# Patient Record
Sex: FEMALE | Race: WHITE | NOT HISPANIC OR LATINO
[De-identification: names, ages, dates, MRNs, and addresses within clinical notes are randomized per-mention and may not be internally consistent; named-entity substitution may affect disease eponyms.]

---

## 2018-05-15 ENCOUNTER — APPOINTMENT (OUTPATIENT)
Dept: OPHTHALMOLOGY | Facility: CLINIC | Age: 23
End: 2018-05-15
Payer: MEDICAID

## 2018-05-15 PROCEDURE — 92004 COMPRE OPH EXAM NEW PT 1/>: CPT

## 2018-05-15 PROCEDURE — 92015 DETERMINE REFRACTIVE STATE: CPT

## 2019-05-14 ENCOUNTER — APPOINTMENT (OUTPATIENT)
Dept: OPHTHALMOLOGY | Facility: CLINIC | Age: 24
End: 2019-05-14

## 2019-10-30 ENCOUNTER — APPOINTMENT (OUTPATIENT)
Dept: OPHTHALMOLOGY | Facility: CLINIC | Age: 24
End: 2019-10-30

## 2019-11-12 ENCOUNTER — APPOINTMENT (OUTPATIENT)
Dept: OPHTHALMOLOGY | Facility: CLINIC | Age: 24
End: 2019-11-12
Payer: MEDICAID

## 2019-11-12 ENCOUNTER — NON-APPOINTMENT (OUTPATIENT)
Age: 24
End: 2019-11-12

## 2019-11-12 PROCEDURE — 92014 COMPRE OPH EXAM EST PT 1/>: CPT

## 2020-11-17 ENCOUNTER — NON-APPOINTMENT (OUTPATIENT)
Age: 25
End: 2020-11-17

## 2020-11-17 ENCOUNTER — APPOINTMENT (OUTPATIENT)
Dept: OPHTHALMOLOGY | Facility: CLINIC | Age: 25
End: 2020-11-17
Payer: MEDICAID

## 2020-11-17 PROCEDURE — 92014 COMPRE OPH EXAM EST PT 1/>: CPT

## 2023-03-15 ENCOUNTER — EMERGENCY (EMERGENCY)
Facility: HOSPITAL | Age: 28
LOS: 1 days | Discharge: STILL ADMITTED OR ADMINISTRATIVE | End: 2023-03-15
Admitting: EMERGENCY MEDICINE
Payer: MEDICAID

## 2023-03-15 ENCOUNTER — OUTPATIENT (OUTPATIENT)
Dept: INPATIENT UNIT | Facility: HOSPITAL | Age: 28
LOS: 1 days | End: 2023-03-15
Payer: MEDICAID

## 2023-03-15 ENCOUNTER — APPOINTMENT (OUTPATIENT)
Dept: ANTEPARTUM | Facility: CLINIC | Age: 28
End: 2023-03-15

## 2023-03-15 VITALS
DIASTOLIC BLOOD PRESSURE: 90 MMHG | RESPIRATION RATE: 16 BRPM | SYSTOLIC BLOOD PRESSURE: 139 MMHG | TEMPERATURE: 98 F | HEART RATE: 93 BPM | OXYGEN SATURATION: 100 %

## 2023-03-15 DIAGNOSIS — Z90.89 ACQUIRED ABSENCE OF OTHER ORGANS: Chronic | ICD-10-CM

## 2023-03-15 DIAGNOSIS — O14.90 UNSPECIFIED PRE-ECLAMPSIA, UNSPECIFIED TRIMESTER: ICD-10-CM

## 2023-03-15 DIAGNOSIS — I83.819 VARICOSE VEINS OF UNSPECIFIED LOWER EXTREMITY WITH PAIN: ICD-10-CM

## 2023-03-15 DIAGNOSIS — R60.0 LOCALIZED EDEMA: ICD-10-CM

## 2023-03-15 DIAGNOSIS — Z98.891 HISTORY OF UTERINE SCAR FROM PREVIOUS SURGERY: Chronic | ICD-10-CM

## 2023-03-15 LAB
ALBUMIN SERPL ELPH-MCNC: 3.8 G/DL — SIGNIFICANT CHANGE UP (ref 3.3–5)
ALP SERPL-CCNC: 86 U/L — SIGNIFICANT CHANGE UP (ref 40–120)
ALT FLD-CCNC: 26 U/L — SIGNIFICANT CHANGE UP (ref 4–33)
ANION GAP SERPL CALC-SCNC: 11 MMOL/L — SIGNIFICANT CHANGE UP (ref 7–14)
APTT BLD: 32.6 SEC — SIGNIFICANT CHANGE UP (ref 27–36.3)
AST SERPL-CCNC: 29 U/L — SIGNIFICANT CHANGE UP (ref 4–32)
BASOPHILS # BLD AUTO: 0.08 K/UL — SIGNIFICANT CHANGE UP (ref 0–0.2)
BASOPHILS NFR BLD AUTO: 0.7 % — SIGNIFICANT CHANGE UP (ref 0–2)
BILIRUB SERPL-MCNC: 0.4 MG/DL — SIGNIFICANT CHANGE UP (ref 0.2–1.2)
BUN SERPL-MCNC: 9 MG/DL — SIGNIFICANT CHANGE UP (ref 7–23)
CALCIUM SERPL-MCNC: 8.9 MG/DL — SIGNIFICANT CHANGE UP (ref 8.4–10.5)
CHLORIDE SERPL-SCNC: 101 MMOL/L — SIGNIFICANT CHANGE UP (ref 98–107)
CO2 SERPL-SCNC: 26 MMOL/L — SIGNIFICANT CHANGE UP (ref 22–31)
CREAT SERPL-MCNC: 0.42 MG/DL — LOW (ref 0.5–1.3)
EGFR: 137 ML/MIN/1.73M2 — SIGNIFICANT CHANGE UP
EOSINOPHIL # BLD AUTO: 0.11 K/UL — SIGNIFICANT CHANGE UP (ref 0–0.5)
EOSINOPHIL NFR BLD AUTO: 1 % — SIGNIFICANT CHANGE UP (ref 0–6)
FIBRINOGEN PPP-MCNC: 518 MG/DL — HIGH (ref 200–465)
GLUCOSE SERPL-MCNC: 89 MG/DL — SIGNIFICANT CHANGE UP (ref 70–99)
HCT VFR BLD CALC: 36.5 % — SIGNIFICANT CHANGE UP (ref 34.5–45)
HGB BLD-MCNC: 11.9 G/DL — SIGNIFICANT CHANGE UP (ref 11.5–15.5)
IANC: 7.13 K/UL — SIGNIFICANT CHANGE UP (ref 1.8–7.4)
IMM GRANULOCYTES NFR BLD AUTO: 1.2 % — HIGH (ref 0–0.9)
INR BLD: 0.93 RATIO — SIGNIFICANT CHANGE UP (ref 0.88–1.16)
LDH SERPL L TO P-CCNC: 199 U/L — SIGNIFICANT CHANGE UP (ref 135–225)
LYMPHOCYTES # BLD AUTO: 28 % — SIGNIFICANT CHANGE UP (ref 13–44)
LYMPHOCYTES # BLD AUTO: 3.15 K/UL — SIGNIFICANT CHANGE UP (ref 1–3.3)
MCHC RBC-ENTMCNC: 26.1 PG — LOW (ref 27–34)
MCHC RBC-ENTMCNC: 32.6 GM/DL — SIGNIFICANT CHANGE UP (ref 32–36)
MCV RBC AUTO: 80 FL — SIGNIFICANT CHANGE UP (ref 80–100)
MONOCYTES # BLD AUTO: 0.63 K/UL — SIGNIFICANT CHANGE UP (ref 0–0.9)
MONOCYTES NFR BLD AUTO: 5.6 % — SIGNIFICANT CHANGE UP (ref 2–14)
NEUTROPHILS # BLD AUTO: 7.13 K/UL — SIGNIFICANT CHANGE UP (ref 1.8–7.4)
NEUTROPHILS NFR BLD AUTO: 63.5 % — SIGNIFICANT CHANGE UP (ref 43–77)
NRBC # BLD: 0 /100 WBCS — SIGNIFICANT CHANGE UP (ref 0–0)
NRBC # FLD: 0 K/UL — SIGNIFICANT CHANGE UP (ref 0–0)
PLATELET # BLD AUTO: 301 K/UL — SIGNIFICANT CHANGE UP (ref 150–400)
POTASSIUM SERPL-MCNC: 3.2 MMOL/L — LOW (ref 3.5–5.3)
POTASSIUM SERPL-SCNC: 3.2 MMOL/L — LOW (ref 3.5–5.3)
PROT SERPL-MCNC: 6.9 G/DL — SIGNIFICANT CHANGE UP (ref 6–8.3)
PROTHROM AB SERPL-ACNC: 10.8 SEC — SIGNIFICANT CHANGE UP (ref 10.5–13.4)
RBC # BLD: 4.56 M/UL — SIGNIFICANT CHANGE UP (ref 3.8–5.2)
RBC # FLD: 14.7 % — HIGH (ref 10.3–14.5)
SODIUM SERPL-SCNC: 138 MMOL/L — SIGNIFICANT CHANGE UP (ref 135–145)
URATE SERPL-MCNC: 5.6 MG/DL — SIGNIFICANT CHANGE UP (ref 2.5–7)
WBC # BLD: 11.23 K/UL — HIGH (ref 3.8–10.5)
WBC # FLD AUTO: 11.23 K/UL — HIGH (ref 3.8–10.5)

## 2023-03-15 PROCEDURE — L9996: CPT

## 2023-03-15 PROCEDURE — 99213 OFFICE O/P EST LOW 20 MIN: CPT

## 2023-03-15 RX ORDER — SODIUM CHLORIDE 9 MG/ML
3 INJECTION INTRAMUSCULAR; INTRAVENOUS; SUBCUTANEOUS EVERY 8 HOURS
Refills: 0 | Status: DISCONTINUED | OUTPATIENT
Start: 2023-03-15 | End: 2023-03-15

## 2023-03-15 NOTE — OB POSTPARTUM TRIAGE NOTE - NSOBPROVIDERNOTE_OBGYN_ALL_OB_FT
28y/o  s/p  3/8/2023 :  no evidence of DVT   no evidence of PEC   maternal and fetal status reassuring  plan of care d/e dr arroyo  discharge home  PEC s& sxs d/w pt  pt to follow up with OB   increase fluid intake  w/v discharge instructions given  discharged home    discharged @ 26y/o  s/p  3/8/2023 :  no evidence of DVT   no evidence of PEC   maternal and fetal status reassuring    1800:  Dr Reyes in attendance to evaluate patient  no evidence of PEC  no evidence of DVT   maternal and fetal status reassuring  pt to be discharged home   PEC s & sxs d/w pt  monitor BP in am and @ bedtime if BP > 150/100 contact MD  pt to follow up with OB on 3/20/2023  w/v discharge instructions given  discharged home 26y/o  s/p  3/8/2023 :  no evidence of DVT   no evidence of PEC   maternal and fetal status reassuring    1800:  Dr Reyes in attendance to evaluate patient  no evidence of PEC  no evidence of DVT   maternal and fetal status reassuring  pt to be discharged home   PEC s & sxs d/w pt  monitor BP in am and @ bedtime if BP > 150/100 contact MD  pt to follow up with OB on 3/20/2023  w/v discharge instructions given  discharged home      discharged @ 1815 28y/o  s/p  3/8/2023 :  no evidence of DVT   no evidence of PEC   maternal and fetal status reassuring    1800:  Dr Reyes in attendance to evaluate patient  no evidence of PEC  no evidence of DVT   maternal and fetal status reassuring    pt to be discharged home   PEC s & sxs d/w pt  monitor BP in am and @ bedtime if BP > 150/100 contact MD  pt to follow up with OB on 3/20/2023  w/v discharge instructions given  discharged home    discharged @ 1815        Attending Addendum  Briefly, this is a 28yo P2 s/p now POD    INCOMPLETE 28y/o  s/p  3/8/2023 :  no evidence of DVT   no evidence of PEC   maternal and fetal status reassuring    1800:  Dr Reyes in attendance to evaluate patient  no evidence of PEC  no evidence of DVT   maternal and fetal status reassuring    pt to be discharged home   PEC s & sxs d/w pt  monitor BP in am and @ bedtime if BP > 150/100 contact MD  pt to follow up with OB on 3/20/2023  w/v discharge instructions given  discharged home    discharged @ 1815        Attending Addendum  Briefly, this is a 28yo P2 s/p now POD7 s/p uncomplicated scheduled repeat c/s at term at [facility] who now presents for LE edema for 1 day, worse in the R leg. She denies headache, blurry vision, scotoma, chest pain, shortness of breath, or nausea/vomiting. Denies any significant ObHx, GynHx, PMH, PSH. She was started on Lovenox 40mg IM prophylactically for 2 weeks post-op by her ObGyn. Vitals reviewed: -142/63-84 (one mild range BP), HR 66-90. W 11.2, Hb 11.9, Plt 301, Cr 0.42, A/A 29//. Imaging reviewed: LE dopplers shows no DVT in either lower extremity. Patient now ruled out for DVT, had one mild range BP on initial presentation though other BPs wnl, does not rule in for preeclampsia at this time. Discussed above with patient and recommendation to monitor BPs at home. Strict preeclampsia precautions discussed. Recommend compression socks and elevation for LE edema. Patient to make f/u appointment with her ObGyn next week .     Homero BAUGH  ObGyn Service Attending

## 2023-03-15 NOTE — ED ADULT TRIAGE NOTE - CHIEF COMPLAINT QUOTE
Pt 1 week post partum s/p  c/o swelling to bilateral legs worse in the L leg. Pt taking Lovenox as prescribed. Denies shortness of breath, chest pain, headache, N/V. Pt denies pregnancy complications.

## 2023-03-15 NOTE — OB POSTPARTUM TRIAGE NOTE - ADDITIONAL INSTRUCTIONS
no evidence of PEC  no evidence of DVT   maternal and fetal status reassuring  pt to be discharged home   PEC s & sxs d/w pt  monitor BP in am and @ bedtime if BP > 150/100 contact MD  pt to follow up with OB on 3/20/2023  w/v discharge instructions given  discharged home      discharged @ 1815

## 2023-03-15 NOTE — OB POSTPARTUM TRIAGE NOTE - NSHPPHYSICALEXAM_GEN_ALL_CORE
abdomen:   C,D,I steri- strips intact   lungs- CTAB   heart : regular rate and rhythm   LE:   no erythema noted no warmth to touch   LE symmetrical  DTR's: 2+   1534: 142/84 P: 90  1545 : 127/76 P: 77  1556: 133/82 P: 73 abdomen:   C,D,I steri- strips intact   lungs- CTAB   heart : regular rate and rhythm   LE:   no erythema noted no warmth to touch   LE symmetrical  DTR's: 2+   1534: 142/84 P: 90  1545 : 127/76 P: 77  1556: 133/82 P: 73  1614: 139/63 P: 78  1630: 118/58 P: 75  1646: 124/74 P: 66 abdomen:   C,D,I steri- strips intact   lungs- CTAB   heart : regular rate and rhythm   LE:   no erythema noted no warmth to touch   LE symmetrical  DTR's: 2+   1534: 142/84 P: 90  1545 : 127/76 P: 77  1556: 133/82 P: 73  1614: 139/63 P: 78  1630: 118/58 P: 75  1646: 124/74 P: 66  1700 126/75 P: 76 abdomen:   C,D,I steri- strips intact   lungs- CTAB   heart : regular rate and rhythm   LE:   no erythema noted no warmth to touch   LE symmetrical  DTR's: 2+   LE: 1+ edema   1534: 142/84 P: 90  1545 : 127/76 P: 77  1556: 133/82 P: 73  1614: 139/63 P: 78  1630: 118/58 P: 75  1646: 124/74 P: 66  1700 126/75 P: 76 abdomen:   C,D,I steri- strips intact   lungs- CTAB   heart : regular rate and rhythm   LE:   no erythema noted no warmth to touch   LE symmetrical  DTR's: 2+   LE: 2 + pitting  edema noted  1534: 142/84 P: 90  1545 : 127/76 P: 77  1556: 133/82 P: 73  1614: 139/63 P: 78  1630: 118/58 P: 75  1646: 124/74 P: 66  1700 126/75 P: 76 abdomen:   C,D,I steri- strips intact   lungs- CTAB   heart : regular rate and rhythm   LE:   no erythema noted no warmth to touch   LE symmetrical  DTR's: 2+   LE: 2 + pitting  edema noted  1534: 142/84 P: 90  1545 : 127/76 P: 77  1556: 133/82 P: 73  1614: 139/63 P: 78  1630: 118/58 P: 75  1646: 124/74 P: 66  1700 126/75 P: 76      addendum @ 1820 :  124/78 74 18 O2 SAt : 100 % room air

## 2023-03-15 NOTE — OB POSTPARTUM TRIAGE NOTE - NSHPLABSRESULTS_GEN_ALL_CORE
PEC labs  bilateral LE ultrasound PEC labs  bilateral LE ultrasound    CBC Full  -  ( 15 Mar 2023 16:00 )  WBC Count : 11.23 K/uL  RBC Count : 4.56 M/uL  Hemoglobin : 11.9 g/dL  Hematocrit : 36.5 %  Platelet Count - Automated : 301 K/uL  Mean Cell Volume : 80.0 fL  Mean Cell Hemoglobin : 26.1 pg  Mean Cell Hemoglobin Concentration : 32.6 gm/dL  Auto Neutrophil # : 7.13 K/uL  Auto Lymphocyte # : 3.15 K/uL  Auto Monocyte # : 0.63 K/uL  Auto Eosinophil # : 0.11 K/uL  Auto Basophil # : 0.08 K/uL  Auto Neutrophil % : 63.5 %  Auto Lymphocyte % : 28.0 %  Auto Monocyte % : 5.6 %  Auto Eosinophil % : 1.0 %  Auto Basophil % : 0.7 %    PT/INR - ( 15 Mar 2023 16:00 )   PT: 10.8 sec;   INR: 0.93 ratio         PTT - ( 15 Mar 2023 16:00 )  PTT:32.6 sec    fibrinogen: 518    03-15    138  |  101  |  9   ----------------------------<  89  3.2<L>   |  26  |  0.42<L>    Ca    8.9      15 Mar 2023 16:00    TPro  6.9  /  Alb  3.8  /  TBili  0.4  /  DBili  x   /  AST  29  /  ALT  26  /  AlkPhos  86  03-15    uric acid: 5.6  LDH: 199 PEC labs  bilateral LE ultrasound    CBC Full  -  ( 15 Mar 2023 16:00 )  WBC Count : 11.23 K/uL  RBC Count : 4.56 M/uL  Hemoglobin : 11.9 g/dL  Hematocrit : 36.5 %  Platelet Count - Automated : 301 K/uL  Mean Cell Volume : 80.0 fL  Mean Cell Hemoglobin : 26.1 pg  Mean Cell Hemoglobin Concentration : 32.6 gm/dL  Auto Neutrophil # : 7.13 K/uL  Auto Lymphocyte # : 3.15 K/uL  Auto Monocyte # : 0.63 K/uL  Auto Eosinophil # : 0.11 K/uL  Auto Basophil # : 0.08 K/uL  Auto Neutrophil % : 63.5 %  Auto Lymphocyte % : 28.0 %  Auto Monocyte % : 5.6 %  Auto Eosinophil % : 1.0 %  Auto Basophil % : 0.7 %    PT/INR - ( 15 Mar 2023 16:00 )   PT: 10.8 sec;   INR: 0.93 ratio         PTT - ( 15 Mar 2023 16:00 )  PTT:32.6 sec    fibrinogen: 518    03-15    138  |  101  |  9   ----------------------------<  89  3.2<L>   |  26  |  0.42<L>    Ca    8.9      15 Mar 2023 16:00    TPro  6.9  /  Alb  3.8  /  TBili  0.4  /  DBili  x   /  AST  29  /  ALT  26  /  AlkPhos  86  03-15    uric acid: 5.6  LDH: 199    US duplex lower extremities veins complex:  impression:   no evidence of deep vein thrombosis

## 2023-03-15 NOTE — OB POSTPARTUM TRIAGE NOTE - HISTORY OF PRESENT ILLNESS
26 y/o  s/p  on 3/8/2023 @ [facility] in [place] presents with c/o bilateral LE since 3/14/2023 states left leg edema more than right lower extremity , pt with a history of varicose veins in lower extremities and states wore compression stockings during pregnancy and states will follow up with vascular postpartum as per pt's OB denies any erythema or warmth to touch on LE denies any SOB chest pain or palpitations pt states is breastfeeding and was instructed by her OB attending to got to ER  28 y/o  s/p  on 3/8/2023 @ [facility] in [place] presents with c/o bilateral LE since 3/14/2023 states left leg edema more than right lower extremity , pt with a history of varicose veins in lower extremities and states wore compression stockings during pregnancy and states will follow up with vascular postpartum as per pt's OB denies any erythema or warmth to touch on LE denies any SOB chest pain or palpitations pt states is breastfeeding and was instructed by her OB attending to got to ER . 28 y/o  s/p  on 3/8/2023 @ [facility] in [place] presents with c/o bilateral LE since 3/14/2023 states left leg edema more than right lower extremity , pt with a history of varicose veins in lower extremities and states wore compression stockings during pregnancy and states will follow up with vascular postpartum as per pt's OB denies any erythema or warmth to touch on LE denies any SOB chest pain or palpitations pt states is breastfeeding and was instructed by her OB attending to got to ER .  pt currently on Lovenox 40 mg sq in AM since 3/10/2023

## 2023-06-20 PROBLEM — Z86.79 PERSONAL HISTORY OF OTHER DISEASES OF THE CIRCULATORY SYSTEM: Chronic | Status: ACTIVE | Noted: 2023-03-15

## 2023-10-25 ENCOUNTER — APPOINTMENT (OUTPATIENT)
Dept: OPHTHALMOLOGY | Facility: CLINIC | Age: 28
End: 2023-10-25

## 2024-08-11 ENCOUNTER — NON-APPOINTMENT (OUTPATIENT)
Age: 29
End: 2024-08-11

## 2024-08-12 ENCOUNTER — APPOINTMENT (OUTPATIENT)
Dept: OBGYN | Facility: CLINIC | Age: 29
End: 2024-08-12

## 2024-08-12 ENCOUNTER — LABORATORY RESULT (OUTPATIENT)
Age: 29
End: 2024-08-12

## 2024-08-12 VITALS
HEIGHT: 66 IN | HEART RATE: 88 BPM | DIASTOLIC BLOOD PRESSURE: 83 MMHG | WEIGHT: 184 LBS | BODY MASS INDEX: 29.57 KG/M2 | SYSTOLIC BLOOD PRESSURE: 136 MMHG | OXYGEN SATURATION: 99 %

## 2024-08-12 DIAGNOSIS — Z01.419 ENCOUNTER FOR GYNECOLOGICAL EXAMINATION (GENERAL) (ROUTINE) W/OUT ABNORMAL FINDINGS: ICD-10-CM

## 2024-08-12 PROCEDURE — 99385 PREV VISIT NEW AGE 18-39: CPT

## 2024-08-12 PROCEDURE — G0444 DEPRESSION SCREEN ANNUAL: CPT | Mod: 59

## 2024-09-25 ENCOUNTER — APPOINTMENT (OUTPATIENT)
Dept: OBGYN | Facility: CLINIC | Age: 29
End: 2024-09-25